# Patient Record
(demographics unavailable — no encounter records)

---

## 2024-10-11 NOTE — ASSESSMENT
[FreeTextEntry1] : 50 yo female with frequent UTIs continues to have dysuria despite negative urine culture, also with microhematuria.   Labs and imaging reviewed and interpreted and discussed with patient as noted above.  Refilled phenazopyridine 200 mg TID PRN for dysuria.  With urine studies showing negative urine culture and patient having microhematuria with continued dysuria, recommended cystoscopy. Discussed procedure with patient.   Cystoscopy at next appointment.

## 2024-10-11 NOTE — HISTORY OF PRESENT ILLNESS
[FreeTextEntry1] : 52 yo female with frequent UTIs is here to follow up.   Initial visit on 09/24/2024:  Endorses frequent UTIs. States she had 3 UTIs in the last 6 months, 2 confirmed with urine culture. Went to [place] 5-6 weeks ago and had dysuria and urinary frequency. She went to an urgent care in [place] and was given cipro. Symptoms improved, but symptoms recurred yesterday. Feels warm, chills, nausea, having dysuria, pink-tinged urine since yesterday evening. Also having frequent urination with small volume, drops of urine. Denies fever, vomiting   Office visit 10/11/2024: She completed a course of Bactrim DS BID x5 days since her last visit.  Has been taking Pyridium as needed, which helps with dysuria.  She states some days she has dysuria some days not.  She does not notice if certain food or drinks make the dysuria worse.  No fevers, vomiting, gross hematuria.  Reviewed and interpreted labs and discussed with patient. Labs 09/24/2024: -UA: Positive nitrite, moderate leuk, 5 RBC/hpf, 4 WBC/hpf -Urine culture: Normal  joe  RBUS 09/30/2024: -Right kidney: 10.8 cm. No renal mass, hydronephrosis or calculi. -Left kidney: 11.2 cm. No renal mass, hydronephrosis or calculi. -Urinary bladder: Within normal limits. No significant urinary bladder retention.

## 2025-02-17 NOTE — ASSESSMENT
[FreeTextEntry1] : Assessment: Bilateral posterolateral/lateral hip pain, possible gluteal muscular pain versus tendinopathy  Plan: 1.  Clinical and radiographic findings reviewed with the patient.  I reviewed today's x-rays with her. 2.  I discussed treatment options with her including initial conservative care with NSAIDs and physical therapy.  I sent a prescription for meloxicam to her pharmacy.  NSAID precautions were given.  She denied contraindications to NSAIDs.  I provided her with a referral to physical therapy and also encouraged her to do home exercises. 3.  I would like to see her back for follow-up in about 6 weeks.  Should her symptoms not improve at that time, we will consider possibly getting advanced imaging.  Plan of care discussed with the patient and she is in agreement.  All questions were answered.  I encouraged her to reach out to the office with any questions or concerns.  X-rays needed at next visit: None

## 2025-02-17 NOTE — HISTORY OF PRESENT ILLNESS
[de-identified] : The patient is a 52-year-old female who is here today for evaluation of back pain and bilateral hip pain.  She has been having pain for about 2 months.  She describes pain over the right and left side of her low back that radiates to the posterolateral and lateral aspects of the hip.  The right side is worse than the left.  This has been going on for about 2 months with no injury.  No groin pain.  No radiating leg pain.  No numbness or tingling.  She has not had any treatment for this.  Past medical/surgical history: Hypertension  Allergies: None reported  Current medications: Amlodipine, losartan, atenolol, water pill  Family history: Noncontributory  Social history: Works full-time, mostly sitting for her job  Review of systems: A 10 point review of systems was positive for back pain, high blood pressure, otherwise unremarkable as noted on the new patient questionnaire  The patient is well-appearing.  Her height is 5 foot 3 and her weight is 150 pounds.  She ambulates with a normal gait.  Examination of the low back demonstrates intact skin.  There is no tenderness on the midline or paraspinals.  She has a negative straight leg raise bilaterally.  5 out of 5 strength across the hip flexors, quads, hamstrings, tib ant, gastroc, EHL bilaterally.  Sensation intact light touch throughout dermatomes bilaterally.  Examination of the bilateral hips demonstrates intact skin.  She has some mild tenderness proximal to the greater trochanter over the gluteal muscles on the right side.  No tenderness on the left in this area.  No tenderness over the greater trochanters.  She has no pain with passive hip range of motion.  She has good supple hip range of motion in flexion, internal and external rotation.  Negative RYLEE negative FADIR.  No pain with hip abduction or adduction.  Neurovascularly intact distally.  Pelvis and bilateral hip x-rays taken in the office today were personally reviewed, demonstrating mild degenerative changes of the bilateral hips with preserved joint spaces  Lumbar spine x-rays taken in the office today were personally reviewed, demonstrating overall maintained disc heights with mild degenerative changes, worst at L5-S1

## 2025-03-13 NOTE — PHYSICAL EXAM
[Chaperone Present] : A chaperone was present in the examining room during all aspects of the physical examination [FreeTextEntry2] : SIERRA [FreeTextEntry1] : Void: 200 cc PVR:  30 cc Urethra was prepped in sterile fashion and then a sterile non- indwelling catheter (14F) was used by me to drain the bladder. The patient tolerated the procedure well. Indication: history of dysuria   Ap: 0  Bp: 0   Well healed incision: Pfannenstiel normal perineal sensation normal perineal reflexes - cough stress test + atrophy + prolapse + urethral hypermobility + bilateral levator ani spasm, + tenderness - urethral tenderness - bladder tenderness - cervical tenderness 0/5 Kegel

## 2025-03-13 NOTE — END OF VISIT
[TextEntry] : 60m E&M, >50% spent in direct face to face counseling/coordination of care history of dysuria, hx of uti, atrophic vaginitis, myalgia, constipation. This excludes cath. All questions answered. Patient reassured.

## 2025-03-13 NOTE — HISTORY OF PRESENT ILLNESS
[FreeTextEntry1] : Pt with pelvic floor dysfunction here for urogynecologic evaluation. She describes:   Chief PFD:  UTI episodes  UTI: symptoms: dysuria, bladder pain, sometimes hematuria, increase frequency, not associated with intercourse, feels better after antibiotics 9/24/2024: urinalysis: +nitrates Mod LE, 5 rbc/hpf, 4 wbc/hpf, UCX: <10K urogenital joe November 2024- was in [place], gross hematuria, urine testing and treated for UTI 9/30/2024: ultrasound: no hydro Takes pyridium as needed for burning  Pelvic organ prolapse: s/p c/s x2, no bulge, reports perineal splinting for Type I Stress urinary incontinence: yes Overactive bladder syndrome: denies Voiding dysfunction: no Incomplete bladder emptying, no hesitancy  Lower urinary tract/vaginal symptoms: as above UTIs per year, as above hematuria, no dysuria, no bladder pain  Fecal incontinence: denies Defecatory dysfunction: Type I Sexual dysfunction: pain deeper and at the opening Pelvic pain: lower quadrants, intermittent, crampy, non radiating, no aggravating or improving factors, 4/10, for years, does not have it currently Vaginal dryness denies  Her pelvic floor symptoms are significantly bothersome and negatively impacting her quality of life.

## 2025-03-13 NOTE — HISTORY OF PRESENT ILLNESS
[FreeTextEntry1] : Pt with pelvic floor dysfunction here for urogynecologic evaluation. She describes:   Chief PFD:  UTI episodes  UTI: symptoms: dysuria, bladder pain, sometimes hematuria, increase frequency, not associated with intercourse, feels better after antibiotics 9/24/2024: urinalysis: +nitrates Mod LE, 5 rbc/hpf, 4 wbc/hpf, UCX: <10K urogenital joe November 2024- was in [place], gross hematuria, urine testing and treated for UTI 9/30/2024: ultrasound: no hydro Takes pyridium as needed for burning  Pelvic organ prolapse: s/p c/s x2, no bulge, reports perineal splinting for Type I Stress urinary incontinence: yes Overactive bladder syndrome: denies Voiding dysfunction: no Incomplete bladder emptying, no hesitancy  Lower urinary tract/vaginal symptoms: as above UTIs per year, as above hematuria, no dysuria, no bladder pain  Fecal incontinence: denies Defecatory dysfunction: Type I Sexual dysfunction: pain deeper and at the opening Pelvic pain: lower quadrants, intermittent, crampy, non radiating, no aggravating or improving factors, 4/10, for years, does not have it currently Vaginal dryness denies  Her pelvic floor symptoms are significantly bothersome and negatively impacting her quality of life.

## 2025-03-13 NOTE — DISCUSSION/SUMMARY
[FreeTextEntry1] :  History of dysuria- Advised the patient that dysuria can be secondary to an acute UTI, irritation from recent UTI or sensitivity of the bladder or atrophy. Will send the urine to rule out infectious etiology, start azo as needed for sensitivity and will start treatment for atrophy and will monitor her symptoms with the above treatment. The patient voiced understanding and agrees with the plan.  History of UTI- Advised the patient that recurrent UTIs are defined as having 3 or more positive urine culture in 1 year or 2 or more in 6 months, which she has not had. Advised to call the office if she feels like she has an infection so that we can arrange testing of her urine. If she keeps getting infections then I will recommend a workup of further evaluation of her kidneys and bladder and further prevention treatment including estrogen vaginal cream and daily antibiotic suppression. The patient voiced understanding and agrees with the plan.  Atrophic vaginitis- We reviewed the risks, benefits, alternatives and indications of local estrogen therapy and I gave her a handout that covers this information. She does opt to begin this therapy. I have given her a prescription and specific instructions on how to use the estrogen cream, applied with a finger at a low dose for urogenital atrophy.  Myalgia- Discussed the pathophysiology of the above condition. Reviewed management options including medications (oral or vaginal suppository), injections, pelvic floor physical therapy, or referral for possible pudendal nerve blocks. The patient agrees to medical management. The risks and benefits of flexeril was reviewed.  Constipation- The patient was counseled about her defecatory dysfunction. We discussed the importance of fiber, hydration and exercise. She was given a handout with specific information about dietary fiber and ways to relieve constipation.

## 2025-03-13 NOTE — COUNSELING
[FreeTextEntry1] : We will notify you of the urine results if they are abnormal.  Please call the office if you feel like you have an infection so that we can arrange testing of your urine. If you keep getting infections then I will recommend further evaluation of  your kidneys and bladder  Please take pyridum as needed for the burning. I sent in refills  Apply a pea size amount of the cream to the opening of the vagina every night for two weeks followed by three nights per week  Please start taking the flexeril (muscle ralxant) twice a day for the pain. It can make you sleepy  We don't expect any changes for at least 2 weeks, we see the full effect at 6 weeks. There are refills at the pharmacy.  Please call my office if you have any issues with the cost or side effects of the medication.  Please start the bowel recipe every morning for stool control. Start with a tablespoon of the mixture every morning for 5-7 days. If you do not have enough improvement, then please increase the dosage by 1 tablespoon, up until 4 tablespoons, every 5-7 days.  Schedule an 8-week med check with my PAKristyn (dysuria,myalgia)

## 2025-03-13 NOTE — HISTORY OF PRESENT ILLNESS
[FreeTextEntry1] : Pt with pelvic floor dysfunction here for urogynecologic evaluation. She describes:   Chief PFD:  UTI episodes  UTI: symptoms: dysuria, bladder pain, sometimes hematuria, increase frequency, not associated with intercourse, feels better after antibiotics 9/24/2024: urinalysis: +nitrates Mod LE, 5 rbc/hpf, 4 wbc/hpf, UCX: <10K urogenital joe November 2024- was in [place], gross hematuria, urine testing and treated for UTI 9/30/2024: ultrasound: no hydro Takes pyridium as needed for burning  Pelvic organ prolapse: s/p c/s x2, no bulge, reports perineal splinting for Type I Stress urinary incontinence: yes Overactive bladder syndrome: denies Voiding dysfunction: no Incomplete bladder emptying, no hesitancy  Lower urinary tract/vaginal symptoms: as above UTIs per year, as above hematuria, no dysuria, no bladder pain  Fecal incontinence: denies Defecatory dysfunction: Type I Sexual dysfunction: pain deeper and at the opening Pelvic pain: lower quadrants, intermittent, crampy, non radiating, no aggravating or improving factors, 4/10, for years, does not have it currently Vaginal dryness denies  Her pelvic floor symptoms are significantly bothersome and negatively impacting her quality of life.

## 2025-03-13 NOTE — REASON FOR VISIT
[TextEntry] : Reason for visit: New Patient Voids per day:   5-6 Voids per night:   1 Urge incontinence:   No Stress incontinence: Yes +cough +laugh +sneeze wears pads Constipation: Yes Fecal incontinence: No Vaginal bulge: No

## 2025-05-09 NOTE — HISTORY OF PRESENT ILLNESS
[FreeTextEntry1] : Patient is here for 2 months med check for UTI symptoms, myalgia.  Last seen on 3/10/25 as a new patient with UTI symptoms.   UTI: symptoms: dysuria, bladder pain, sometimes hematuria, increase frequency, not associated with intercourse, feels better after antibiotics 9/24/2024: urinalysis: +nitrates Mod LE, 5 rbc/hpf, 4 wbc/hpf, UCX: <10K urogenital joe November 2024- was in [place], gross hematuria, urine testing and treated for UTI 9/30/2024: ultrasound: no hydro Takes pyridium as needed for burning   Pelvic pain: lower quadrants, intermittent, crampy, non radiating, no aggravating or improving factors, 4/10, for years, does not have it currently  Estrace cream Ap: 0 Bp: 0 PVR: 30cc  3/10/25, +Urine Culture: GBS, treated with Ampicillin 500mg BID x 7 days Pyridium 200mg prn Flexeril 5mg BID  Today, patient states that she used estrace cream for 2 weeks only, on the outside of the vagina, did not feel that it helped and stopped it. Took Flexeril 5mg but not all the time, did not feel that it helped her but was not sure what it was supposed to do. Patient does not feel she has an infection. Taking Pyridium 200mg prn for burning that she feels occasionally and feels it helped well. C/o feeling pressure at the end of the urination in the mornings only.

## 2025-05-09 NOTE — REASON FOR VISIT
[TextEntry] : cc: pt is here for a med check pt does not believe the vaginal estrogen cream is working as well as the Flexeril pt does see improvement with the Pyridium and does see its effectiveness

## 2025-05-09 NOTE — COUNSELING
[FreeTextEntry1] : If you feel like you have an infection it is important for you to call our office and we will arrange testing of your urine.  Please continue taking Pyridium 200mg as needed.   Please continue Flexeril 5mg at bedtime. Refills sent.  Please increase water intake to 64 oz daily. Please take D Mannose 1000mg twice a day for UTI prevention. Please take cranberry extract (liquid or tablets, minimum 36 PAC) for UTI prevention.  Please continue to use estrace cream three times a week inside the vagine (Monday, Wednesday, Friday)  Please call my office if you have any issues with the cost or side effects of the medication.   Schedule a 3 months follow up med check appointment with HUGO Simons.

## 2025-05-09 NOTE — DISCUSSION/SUMMARY
[FreeTextEntry1] : History of Dysuria Continue taking Pyridium 200mg prn only Advised to increase water intake  Follow up 3 months  History of UTIs Advised to call with any symptoms Advised to increase water intake to 64 oz daily. Advised to take D Mannose 1000mg BID for UTI prevention. Advised to take cranberry extract (at least 36 PAC) for UTI prevention.  Myalgia Continue Flexeril 5mg QHS every night, refills given Precautions reviewed  Atrophic Vaginitis Advised to continue to use estrace cream three times a week.

## 2025-06-23 NOTE — ASSESSMENT
[FreeTextEntry1] : Assessment: Right posterior lateral/lateral hip pain, possible gluteal muscular pain versus tendinopathy, improved with physical therapy  Plan: 1.  Clinical findings were discussed with the patient.  Her symptoms have improved since I last saw her with physical therapy. 2.  I again discussed treatment options with her including continued NSAIDs as needed as well as continued physical therapy as scheduled. 3.  Today, she also reports several week history of "bone pain" diffusely in both her upper extremities and both her lower extremities.  I discussed with her that this is somewhat outside the scope of my practice and that I would recommend her seeing rheumatology for further evaluation of this.  I provided her with a referral to rheumatology. 4.  For her right hip, I be happy to see her back in the office as needed.  Plan of care discussed with the patient and she is in agreement.  All questions were answered.  I encouraged her to reach out with any questions or concerns.

## 2025-06-23 NOTE — HISTORY OF PRESENT ILLNESS
[de-identified] : The patient is a 52-year-old female who is here today for follow-up of right hip pain.  I last saw her in February of this year.  She has been doing physical therapy and reports that the pain has improved.  The patient is well-appearing.  She ambulates with a normal gait.  Examination of the right hip demonstrates intact skin.  Mild tenderness proximal to the greater trochanter over the gluteal muscles.  No tenderness over the greater trochanters.  No pain with passive hip range of motion.  Supple hip range of motion with no guarding.  Negative Taylor negative FADIR.  Neurovascular intact distally.  No new imaging was obtained today.